# Patient Record
Sex: FEMALE | Race: ASIAN | Employment: OTHER | URBAN - METROPOLITAN AREA
[De-identification: names, ages, dates, MRNs, and addresses within clinical notes are randomized per-mention and may not be internally consistent; named-entity substitution may affect disease eponyms.]

---

## 2017-01-05 PROCEDURE — 87624 HPV HI-RISK TYP POOLED RSLT: CPT | Performed by: INTERNAL MEDICINE

## 2017-01-05 PROCEDURE — 88175 CYTOPATH C/V AUTO FLUID REDO: CPT | Performed by: INTERNAL MEDICINE

## 2018-12-15 PROCEDURE — 86618 LYME DISEASE ANTIBODY: CPT | Performed by: INTERNAL MEDICINE

## 2024-11-20 ENCOUNTER — OFFICE VISIT (OUTPATIENT)
Dept: INTERNAL MEDICINE CLINIC | Facility: CLINIC | Age: 74
End: 2024-11-20
Payer: MEDICARE

## 2024-11-20 VITALS
SYSTOLIC BLOOD PRESSURE: 130 MMHG | DIASTOLIC BLOOD PRESSURE: 80 MMHG | BODY MASS INDEX: 27.79 KG/M2 | OXYGEN SATURATION: 100 % | WEIGHT: 136 LBS | HEART RATE: 80 BPM | HEIGHT: 58.47 IN

## 2024-11-20 DIAGNOSIS — J45.20 MILD INTERMITTENT ASTHMA WITHOUT COMPLICATION (HCC): Primary | ICD-10-CM

## 2024-11-20 DIAGNOSIS — E78.5 MILD HYPERLIPIDEMIA: ICD-10-CM

## 2024-11-20 DIAGNOSIS — E55.9 VITAMIN D DEFICIENCY: ICD-10-CM

## 2024-11-20 DIAGNOSIS — M17.12 PRIMARY OSTEOARTHRITIS OF LEFT KNEE: ICD-10-CM

## 2024-11-20 DIAGNOSIS — B35.1 ONYCHOMYCOSIS: ICD-10-CM

## 2024-11-20 PROCEDURE — 99203 OFFICE O/P NEW LOW 30 MIN: CPT | Performed by: INTERNAL MEDICINE

## 2024-11-20 RX ORDER — CICLOPIROX 80 MG/ML
SOLUTION TOPICAL
Qty: 6 ML | Refills: 1 | Status: SHIPPED | OUTPATIENT
Start: 2024-11-20

## 2024-11-20 NOTE — PROGRESS NOTES
Genny Brady is a 74 year old female.    Chief Complaint   Patient presents with    Establish Care     Room 4, [facility]Z, pt is here for establish care. H/o asthma. Toenail problem       HPI:     Pleasant patient with mild intermittent asthma, osteoarthritis of knee and hands, mild HL here for establishing care/review of chronic issues.  She is  with grown daughter and also has grandchild. She retired from mortgage servicing.  Asthma is doing well, she mostly uses albuterol in the spring or fall. She does not need refill right now. No SOB/cough  She use to have left knee pain from arthritis but since gel injection last spring at the [facility], she does not have pain. She is able to ambulate without problems.  She is c/o thick, yellow toenails for quite some time, no pain. Open to trying topical treatment      Patient Active Problem List   Diagnosis    ASTHMA    Elevated BP    Onychomycosis    Mild hyperlipidemia    Primary osteoarthritis of left knee    Vitamin D deficiency     Current Outpatient Medications   Medication Sig Dispense Refill    Ciclopirox 8 % External Solution Apply a layer to affected nail daily, remove with acetone q7 days 6 mL 1    fluticasone furoate-vilanterol 100-25 MCG/INH Inhalation Aerosol Powder, Breath Activated Inhale 1 puff into the lungs daily. 1 each 0    Pseudoephedrine-guaiFENesin -1200 MG Oral Tablet 12 Hr Take 1 tablet by mouth 2 (two) times daily.      Albuterol 90 MCG/ACT Inhalation Aero Soln 1 puff bid daily prn 1 Inhaler 3    Clobetasol Propionate 0.05 % Apply Externally Cream apPLY EVERY DAY AS NEEDED 60 g 0    MULTIVITAMIN OR 1 QD      Azelastine-Fluticasone (DYMISTA) 137-50 MCG/ACT Nasal Suspension 2 sprays by Nasal route daily. (Patient taking differently: 2 sprays by Nasal route daily as needed.) 1 Bottle prn      Past Medical History:    ASTHMA    ECZEMA      HEADACHES    OTHER DISEASES    arthritis    SEASONAL ALLERGIES      Social History:  Social  History     Socioeconomic History    Marital status:    Tobacco Use    Smoking status: Never     Passive exposure: Never    Smokeless tobacco: Never   Substance and Sexual Activity    Alcohol use: Yes     Alcohol/week: 0.0 standard drinks of alcohol     Comment: occ    Drug use: No   Social History Narrative    Marital status     Occupation mortgage banking    Tobacco no    ETOH 2/week    Exercise not reg    Mammo 8/2010    Dexa few years ago    Pap 6/2010    Colonoscopy 2003, due in 2013    Lipid normal 2010    Glucose normal    PSA n/a    Influenza wants today    Pneumovax had in the past    Tetanus doesn't recommend    Shingles - wants, will check with insurance                                 Family History   Problem Relation Age of Onset    Cancer Mother         liver    Cancer Father         multiple myeloma    Hypertension Brother         Allergies  Allergies[1]      REVIEW OF SYSTEMS:   GENERAL HEALTH:  no fevers   RESPIRATORY: no cough  CARDIOVASCULAR: denies chest pain  GI: denies abdominal pain  : no dysuria  NEURO: denies headaches  PSYCH: No reported depression   HEME: No adenopathy      EXAM:   /80 (BP Location: Left arm, Patient Position: Sitting, Cuff Size: adult)   Pulse 80   Ht 4' 10.47\" (1.485 m)   Wt 136 lb (61.7 kg)   SpO2 100%   BMI 27.97 kg/m²   GENERAL: well developed, well nourished,in no apparent distress  HEENT: atraumatic, normocephalic  NECK: supple,no adenopathy  LUNGS: normal rate without respiratory distress, lungs clear to auscultation  CARDIO: RRR nl S1 S2  GI: normal bowel sounds, soft, NT/ND  EXTREMITIES: multiple toenails thick and yellow  NEURO: Alert and oriented    ASSESSMENT AND PLAN:     Encounter Diagnoses   Name     Onychomycosis- penlac as directed     Mild hyperlipidemia- heart healthy diet, check lipids     Primary osteoarthritis of left knee- doing well s/p gel injection, monitor symptoms     Vitamin D deficiency- otc vit d 1000- 200 daily,  check level     Mild intermittent asthma without complication (HCC)- well controlled, mostly uses albuterol inhaler in the spring        Orders Placed This Encounter   Procedures    CBC W Differential W Platelet [E]    Comp Metabolic Panel (14)    TSH W Reflex To Free T4 [E]    Lipid Panel [E]    VITAMIN D, 25-HYDROXY [85807][Q]       Meds & Refills for this Visit:  Requested Prescriptions     Signed Prescriptions Disp Refills    Ciclopirox 8 % External Solution 6 mL 1     Sig: Apply a layer to affected nail daily, remove with acetone q7 days       Imaging & Consults:  None    Return in about 3 months (around 2/20/2025), or if symptoms worsen or fail to improve, for annual.  There are no Patient Instructions on file for this visit.      The patient indicates understanding of these issues and agrees to the plan.           [1]   Allergies  Allergen Reactions    Penicillins

## 2024-11-21 ENCOUNTER — LAB ENCOUNTER (OUTPATIENT)
Dept: LAB | Age: 74
End: 2024-11-21
Attending: INTERNAL MEDICINE
Payer: MEDICARE

## 2024-11-21 DIAGNOSIS — B35.1 ONYCHOMYCOSIS: ICD-10-CM

## 2024-11-21 DIAGNOSIS — J45.20 MILD INTERMITTENT ASTHMA WITHOUT COMPLICATION (HCC): ICD-10-CM

## 2024-11-21 DIAGNOSIS — E78.5 MILD HYPERLIPIDEMIA: ICD-10-CM

## 2024-11-21 DIAGNOSIS — M17.12 PRIMARY OSTEOARTHRITIS OF LEFT KNEE: ICD-10-CM

## 2024-11-21 DIAGNOSIS — E55.9 VITAMIN D DEFICIENCY: ICD-10-CM

## 2024-11-21 LAB
ALBUMIN SERPL-MCNC: 4.3 G/DL (ref 3.2–4.8)
ALBUMIN/GLOB SERPL: 1.3 {RATIO} (ref 1–2)
ALP LIVER SERPL-CCNC: 62 U/L
ALT SERPL-CCNC: 23 U/L
ANION GAP SERPL CALC-SCNC: 6 MMOL/L (ref 0–18)
AST SERPL-CCNC: 23 U/L (ref ?–34)
BASOPHILS # BLD AUTO: 0.07 X10(3) UL (ref 0–0.2)
BASOPHILS NFR BLD AUTO: 1.4 %
BILIRUB SERPL-MCNC: 0.5 MG/DL (ref 0.2–1.1)
BUN BLD-MCNC: 14 MG/DL (ref 9–23)
CALCIUM BLD-MCNC: 10 MG/DL (ref 8.7–10.4)
CHLORIDE SERPL-SCNC: 107 MMOL/L (ref 98–112)
CHOLEST SERPL-MCNC: 219 MG/DL (ref ?–200)
CO2 SERPL-SCNC: 27 MMOL/L (ref 21–32)
CREAT BLD-MCNC: 0.84 MG/DL
EGFRCR SERPLBLD CKD-EPI 2021: 74 ML/MIN/1.73M2 (ref 60–?)
EOSINOPHIL # BLD AUTO: 0.22 X10(3) UL (ref 0–0.7)
EOSINOPHIL NFR BLD AUTO: 4.4 %
ERYTHROCYTE [DISTWIDTH] IN BLOOD BY AUTOMATED COUNT: 13.5 %
FASTING PATIENT LIPID ANSWER: YES
FASTING STATUS PATIENT QL REPORTED: YES
GLOBULIN PLAS-MCNC: 3.3 G/DL (ref 2–3.5)
GLUCOSE BLD-MCNC: 93 MG/DL (ref 70–99)
HCT VFR BLD AUTO: 40.6 %
HDLC SERPL-MCNC: 76 MG/DL (ref 40–59)
HGB BLD-MCNC: 12.6 G/DL
IMM GRANULOCYTES # BLD AUTO: 0.01 X10(3) UL (ref 0–1)
IMM GRANULOCYTES NFR BLD: 0.2 %
LDLC SERPL CALC-MCNC: 130 MG/DL (ref ?–100)
LYMPHOCYTES # BLD AUTO: 1.92 X10(3) UL (ref 1–4)
LYMPHOCYTES NFR BLD AUTO: 38 %
MCH RBC QN AUTO: 27.7 PG (ref 26–34)
MCHC RBC AUTO-ENTMCNC: 31 G/DL (ref 31–37)
MCV RBC AUTO: 89.2 FL
MONOCYTES # BLD AUTO: 0.36 X10(3) UL (ref 0.1–1)
MONOCYTES NFR BLD AUTO: 7.1 %
NEUTROPHILS # BLD AUTO: 2.47 X10 (3) UL (ref 1.5–7.7)
NEUTROPHILS # BLD AUTO: 2.47 X10(3) UL (ref 1.5–7.7)
NEUTROPHILS NFR BLD AUTO: 48.9 %
NONHDLC SERPL-MCNC: 143 MG/DL (ref ?–130)
OSMOLALITY SERPL CALC.SUM OF ELEC: 290 MOSM/KG (ref 275–295)
PLATELET # BLD AUTO: 296 10(3)UL (ref 150–450)
POTASSIUM SERPL-SCNC: 3.9 MMOL/L (ref 3.5–5.1)
PROT SERPL-MCNC: 7.6 G/DL (ref 5.7–8.2)
RBC # BLD AUTO: 4.55 X10(6)UL
SODIUM SERPL-SCNC: 140 MMOL/L (ref 136–145)
TRIGL SERPL-MCNC: 72 MG/DL (ref 30–149)
TSI SER-ACNC: 2.05 UIU/ML (ref 0.55–4.78)
VIT D+METAB SERPL-MCNC: 27.1 NG/ML (ref 30–100)
VLDLC SERPL CALC-MCNC: 13 MG/DL (ref 0–30)
WBC # BLD AUTO: 5.1 X10(3) UL (ref 4–11)

## 2024-11-21 PROCEDURE — 80053 COMPREHEN METABOLIC PANEL: CPT

## 2024-11-21 PROCEDURE — 36415 COLL VENOUS BLD VENIPUNCTURE: CPT

## 2024-11-21 PROCEDURE — 80061 LIPID PANEL: CPT

## 2024-11-21 PROCEDURE — 84443 ASSAY THYROID STIM HORMONE: CPT

## 2024-11-21 PROCEDURE — 85025 COMPLETE CBC W/AUTO DIFF WBC: CPT

## 2024-11-21 PROCEDURE — 82306 VITAMIN D 25 HYDROXY: CPT

## 2025-02-26 ENCOUNTER — OFFICE VISIT (OUTPATIENT)
Dept: INTERNAL MEDICINE CLINIC | Facility: CLINIC | Age: 75
End: 2025-02-26
Payer: MEDICARE

## 2025-02-26 VITALS
WEIGHT: 136.38 LBS | DIASTOLIC BLOOD PRESSURE: 78 MMHG | HEIGHT: 58.47 IN | SYSTOLIC BLOOD PRESSURE: 126 MMHG | BODY MASS INDEX: 27.86 KG/M2 | OXYGEN SATURATION: 97 % | HEART RATE: 92 BPM

## 2025-02-26 DIAGNOSIS — Z12.31 ENCOUNTER FOR SCREENING MAMMOGRAM FOR MALIGNANT NEOPLASM OF BREAST: ICD-10-CM

## 2025-02-26 DIAGNOSIS — M17.12 PRIMARY OSTEOARTHRITIS OF LEFT KNEE: ICD-10-CM

## 2025-02-26 DIAGNOSIS — R09.81 SINUS CONGESTION: ICD-10-CM

## 2025-02-26 DIAGNOSIS — Z00.00 ENCOUNTER FOR MEDICARE ANNUAL WELLNESS EXAM: Primary | ICD-10-CM

## 2025-02-26 DIAGNOSIS — Z12.11 SCREEN FOR COLON CANCER: ICD-10-CM

## 2025-02-26 DIAGNOSIS — E55.9 VITAMIN D DEFICIENCY: ICD-10-CM

## 2025-02-26 DIAGNOSIS — Z78.0 POST-MENOPAUSAL: ICD-10-CM

## 2025-02-26 DIAGNOSIS — E78.5 MILD HYPERLIPIDEMIA: ICD-10-CM

## 2025-02-26 DIAGNOSIS — R05.1 ACUTE COUGH: ICD-10-CM

## 2025-02-26 PROCEDURE — 87637 SARSCOV2&INF A&B&RSV AMP PRB: CPT | Performed by: INTERNAL MEDICINE

## 2025-02-26 PROCEDURE — G0439 PPPS, SUBSEQ VISIT: HCPCS | Performed by: INTERNAL MEDICINE

## 2025-02-26 PROCEDURE — 99213 OFFICE O/P EST LOW 20 MIN: CPT | Performed by: INTERNAL MEDICINE

## 2025-02-26 RX ORDER — AZITHROMYCIN 250 MG/1
TABLET, FILM COATED ORAL
Qty: 6 TABLET | Refills: 0 | Status: SHIPPED | OUTPATIENT
Start: 2025-02-26 | End: 2025-03-03

## 2025-02-26 NOTE — PROGRESS NOTES
Subjective:   Genny Brady is a 74 year old female who presents for a Medicare Subsequent Annual Wellness visit (Pt already had Initial Annual Wellness) and scheduled follow up of multiple significant but stable problems.     1. Encounter for Medicare annual wellness exam (Primary)- referred for mammogram, dexa and colonoscopy.   2. Mild hyperlipidemia-  she watches diet, no cardiac complaints  3. Primary osteoarthritis of left knee- doing well at this time, no acute knee problems  4. Vitamin D deficiency- continue vitamin d supplement, check level  -     Vitamin D, 25-Hydroxy; Future; Expected date: 02/26/2025  5. Post-menopausal- due to check dexa, discussed adequate ca and vit d intake  6. Screen for colon cancer- referred to [facility]  7. Sinus congestion- c/o sinus congestion and facial pain along with nasal drainage, feels like another sinusitis. [facility]    8. Acute cough- cough and congestion since her trip to [facility], she did not check for covid 19 or flu. No fever but has chills. Taking garlic and honey supp  History/Other:   Fall Risk Assessment:   She has been screened for Falls and is low risk.      Cognitive Assessment:   She had a completely normal cognitive assessment - see flowsheet entries     Functional Ability/Status:   Genny Brady has a completely normal functional assessment. See flowsheet for details.      Depression Screening (PHQ):  PHQ-2 SCORE: 0  , done 2/26/2025   If you checked off any problems, how difficult have these problems made it for you to do your work, take care of things at home, or get along with other people?: Not difficult at all             Advanced Directives:   She does NOT have a Living Will. [Do you have a living will?: (Patient-Rptd) No]  She does have a POA but we do NOT have it on file in Epic.    Not discussed      Patient Active Problem List   Diagnosis    ASTHMA    Elevated BP    Onychomycosis    Mild hyperlipidemia     Primary osteoarthritis of left knee    Vitamin D deficiency     Allergies:  She is allergic to penicillins.    Current Medications:  Outpatient Medications Marked as Taking for the 2/26/25 encounter (Office Visit) with Madison Velasco MD   Medication Sig    azithromycin (ZITHROMAX Z-MARY) 250 MG Oral Tab Take 2 tablets (500 mg total) by mouth daily for 1 day, THEN 1 tablet (250 mg total) daily for 4 days.    Ciclopirox 8 % External Solution Apply a layer to affected nail daily, remove with acetone q7 days    fluticasone furoate-vilanterol 100-25 MCG/INH Inhalation Aerosol Powder, Breath Activated Inhale 1 puff into the lungs daily.    Pseudoephedrine-guaiFENesin -1200 MG Oral Tablet 12 Hr Take 1 tablet by mouth 2 (two) times daily.    Azelastine-Fluticasone (DYMISTA) 137-50 MCG/ACT Nasal Suspension 2 sprays by Nasal route daily. (Patient taking differently: 2 sprays by Nasal route daily as needed.)    Albuterol 90 MCG/ACT Inhalation Aero Soln 1 puff bid daily prn    Clobetasol Propionate 0.05 % Apply Externally Cream apPLY EVERY DAY AS NEEDED    MULTIVITAMIN OR 1 QD       Medical History:  She  has a past medical history of ASTHMA, ECZEMA, HEADACHES, OTHER DISEASES, and SEASONAL ALLERGIES.  Surgical History:  She  has a past surgical history that includes colonoscopy (2003) and colonoscopy,diagnostic (N/A, 7/24/2015).   Family History:  Her family history includes Cancer in her father and mother; Hypertension in her brother.  Social History:  She  reports that she has never smoked. She has never been exposed to tobacco smoke. She has never used smokeless tobacco. She reports current alcohol use. She reports that she does not use drugs.    Tobacco:  No tobacco    CAGE Alcohol Screen:   CAGE screening score of 0 on 2/26/2025, showing low risk of alcohol abuse.      Patient Care Team:  Madison Velasco MD as PCP - General (Internal Medicine)  Ellen Briseno MD (Internal Medicine)    Review of Systems      Positive for chills, sinus pain, cough, PND. Just came back from [place] 2 days ago    Objective:   Physical Exam  GENERAL: well developed, well nourished,in no apparent distress  HEENT: atraumatic, normocephalic, Tms +fluid, nares +discharge, max sinus TTP  NECK: supple,no adenopathy  LUNGS: normal rate without respiratory distress, lungs clear to auscultation  CARDIO: RRR nl S1 S2  GI: normal bowel sounds, soft, NT/ND  EXTREMITIES: multiple toenails thick and yellow  NEURO: Alert and oriented    /78 (BP Location: Left arm, Patient Position: Sitting, Cuff Size: adult)   Pulse 92   Ht 4' 10.47\" (1.485 m)   Wt 136 lb 6.4 oz (61.9 kg)   SpO2 97%   BMI 28.06 kg/m²  Estimated body mass index is 28.06 kg/m² as calculated from the following:    Height as of this encounter: 4' 10.47\" (1.485 m).    Weight as of this encounter: 136 lb 6.4 oz (61.9 kg).    Medicare Hearing Assessment:   Hearing Screening    Time taken: 2/26/2025 11:00 AM  Entry User: Jean-Paul Parry  Screening Method: Finger Rub  Finger Rub Result: Pass         Visual Acuity:   Right Eye Visual Acuity: Corrected Right Eye Chart Acuity: 20/30   Left Eye Visual Acuity: Corrected Left Eye Chart Acuity: 20/30   Both Eyes Visual Acuity: Corrected Both Eyes Chart Acuity: 20/30   Able To Tolerate Visual Acuity: Yes        Assessment & Plan:   Genny Brady is a 74 year old female who presents for a Medicare Assessment.     1. Encounter for Medicare annual wellness exam (Primary)- referred for mammogram, dexa and colonoscopy.   2. Mild hyperlipidemia- heart healthy diet, monitor yearly  3. Primary osteoarthritis of left knee- doing well at this time, f/u ortho prn  4. Vitamin D deficiency- continue vitamin d supplement, check level  -     Vitamin D, 25-Hydroxy; Future; Expected date: 02/26/2025  5. Post-menopausal- check dexa, discussed adequate ca and vit d intake  6. Screen for colon cancer- referred to [facility]  7. Sinus congestion- exam c/w sinusitis,  zpak sent in (allergic to PCN). Otc tylenol prn pain, push fluids.   -     Azithromycin; Take 2 tablets (500 mg total) by mouth daily for 1 day, THEN 1 tablet (250 mg total) daily for 4 days.  Dispense: 6 tablet; Refill: 0  -     SARS-COV-22-ALINITY; Future; Expected date: 02/26/2025  8. Acute cough- swab done for covid 19, flu and rsv   otc cough syrup prn  -     SARS-COV-22-ALINITY; Future; Expected date: 02/26/2025    The patient indicates understanding of these issues and agrees to the plan.  Continue with current treatment plan.  Reinforced healthy diet, lifestyle, and exercise.      Return if symptoms worsen or fail to improve.     Madison Velasco MD, 2/26/2025     Supplementary Documentation:   General Health:  In the past six months, have you lost more than 10 pounds without trying?: (Patient-Rptd) 2 - No  Has your appetite been poor?: (Patient-Rptd) No  Type of Diet: (Patient-Rptd) Balanced  How does the patient maintain a good energy level?: (Patient-Rptd) Stretching  How would you describe your daily physical activity?: (Patient-Rptd) Light  How would you describe your current health state?: (Patient-Rptd) Fair  How do you maintain positive mental well-being?: (Patient-Rptd) Social Interaction;Visiting Friends;Visiting Family  On a scale of 0 to 10, with 0 being no pain and 10 being severe pain, what is your pain level?: (Patient-Rptd) 4 - (Moderate)  In the past six months, have you experienced urine leakage?: (Patient-Rptd) 1-Yes  At any time do you feel concerned for the safety/well-being of yourself and/or your children, in your home or elsewhere?: (Patient-Rptd) No  Have you had any immunizations at another office such as Influenza, Hepatitis B, Tetanus, or Pneumococcal?: (Patient-Rptd) Yes    Health Maintenance   Topic Date Due    Annual Physical  01/05/2018    Annual Depression Screening  01/01/2025    Colorectal Cancer Screening  07/24/2025    COVID-19 Vaccine (8 - 2024-25 season) 04/15/2025     Mammogram  11/07/2025    Influenza Vaccine  Completed    DEXA Scan  Completed    Fall Risk Screening (Annual)  Completed    Pneumococcal Vaccine: 50+ Years  Completed    Zoster Vaccines  Completed    Meningococcal B Vaccine  Aged Out

## 2025-02-27 LAB
FLUAV + FLUBV RNA SPEC NAA+PROBE: NOT DETECTED
FLUAV + FLUBV RNA SPEC NAA+PROBE: NOT DETECTED
RSV RNA SPEC NAA+PROBE: NOT DETECTED
SARS-COV-2 RNA RESP QL NAA+PROBE: NOT DETECTED

## 2025-03-21 ENCOUNTER — HOSPITAL ENCOUNTER (OUTPATIENT)
Dept: BONE DENSITY | Age: 75
Discharge: HOME | End: 2025-03-21
Attending: INTERNAL MEDICINE
Payer: MEDICARE

## 2025-03-21 DIAGNOSIS — Z78.0 POST-MENOPAUSAL: ICD-10-CM

## 2025-03-21 PROCEDURE — 77080 DXA BONE DENSITY AXIAL: CPT | Performed by: INTERNAL MEDICINE

## 2025-03-24 ENCOUNTER — TELEPHONE (OUTPATIENT)
Dept: INTERNAL MEDICINE CLINIC | Facility: CLINIC | Age: 75
End: 2025-03-24

## 2025-03-24 NOTE — TELEPHONE ENCOUNTER
Per pt, she saw mammo order was in her chart, but she last had mammo at [facility] in Nov.     Per care everywhere, mammo done 11/7/24:    IMPRESSION AND RECOMMENDATION:     No mammographic evidence of malignancy.  If there is no palpable concern, the patient should return   in 1 year for an annual screening mammogram.       VALERIA Velasoc

## 2025-07-25 PROBLEM — Z12.11 SPECIAL SCREENING FOR MALIGNANT NEOPLASMS, COLON: Status: ACTIVE | Noted: 2025-07-25

## 2025-08-06 ENCOUNTER — TELEPHONE (OUTPATIENT)
Dept: INTERNAL MEDICINE CLINIC | Facility: CLINIC | Age: 75
End: 2025-08-06